# Patient Record
Sex: MALE | Race: WHITE | NOT HISPANIC OR LATINO | URBAN - METROPOLITAN AREA
[De-identification: names, ages, dates, MRNs, and addresses within clinical notes are randomized per-mention and may not be internally consistent; named-entity substitution may affect disease eponyms.]

---

## 2024-03-18 ENCOUNTER — TELEPHONE (OUTPATIENT)
Dept: URGENT CARE | Facility: CLINIC | Age: 19
End: 2024-03-18

## 2024-03-19 ENCOUNTER — OCCUPATIONAL HEALTH (OUTPATIENT)
Dept: URGENT CARE | Facility: CLINIC | Age: 19
End: 2024-03-19

## 2024-03-19 DIAGNOSIS — Z02.1 PRE-EMPLOYMENT EXAMINATION: Primary | ICD-10-CM

## 2024-03-19 LAB
CTP QC/QA: YES
POC 10 PANEL DRUG SCREEN: ABNORMAL

## 2024-03-19 PROCEDURE — 99499 UNLISTED E&M SERVICE: CPT | Mod: S$GLB,,, | Performed by: NURSE PRACTITIONER

## 2024-03-19 PROCEDURE — 80305 DRUG TEST PRSMV DIR OPT OBS: CPT | Mod: S$GLB,,, | Performed by: NURSE PRACTITIONER

## 2025-06-01 ENCOUNTER — HOSPITAL ENCOUNTER (EMERGENCY)
Facility: HOSPITAL | Age: 20
Discharge: HOME | End: 2025-06-01
Attending: EMERGENCY MEDICINE
Payer: COMMERCIAL

## 2025-06-01 VITALS
SYSTOLIC BLOOD PRESSURE: 160 MMHG | RESPIRATION RATE: 20 BRPM | HEIGHT: 62 IN | TEMPERATURE: 98 F | DIASTOLIC BLOOD PRESSURE: 92 MMHG | BODY MASS INDEX: 39.36 KG/M2 | HEART RATE: 89 BPM | OXYGEN SATURATION: 100 % | WEIGHT: 213.88 LBS

## 2025-06-01 DIAGNOSIS — R07.89 ATYPICAL CHEST PAIN: ICD-10-CM

## 2025-06-01 DIAGNOSIS — R07.9 CHEST PAIN: Primary | ICD-10-CM

## 2025-06-01 LAB
ABSOLUTE EOSINOPHIL (OHS): 0.14 K/UL
ABSOLUTE MONOCYTE (OHS): 0.81 K/UL (ref 0.3–1)
ABSOLUTE NEUTROPHIL COUNT (OHS): 9.99 K/UL (ref 1.8–7.7)
ALBUMIN SERPL BCP-MCNC: 4.4 G/DL (ref 3.5–5.2)
ALP SERPL-CCNC: 81 UNIT/L (ref 40–150)
ALT SERPL W/O P-5'-P-CCNC: 37 UNIT/L (ref 10–44)
ANION GAP (OHS): 9 MMOL/L (ref 8–16)
AST SERPL-CCNC: 20 UNIT/L (ref 11–45)
BASOPHILS # BLD AUTO: 0.06 K/UL
BASOPHILS NFR BLD AUTO: 0.5 %
BILIRUB SERPL-MCNC: 0.2 MG/DL (ref 0.1–1)
BNP SERPL-MCNC: <10 PG/ML (ref 0–99)
BUN SERPL-MCNC: 15 MG/DL (ref 6–20)
CALCIUM SERPL-MCNC: 9.6 MG/DL (ref 8.7–10.5)
CHLORIDE SERPL-SCNC: 108 MMOL/L (ref 95–110)
CO2 SERPL-SCNC: 23 MMOL/L (ref 23–29)
CREAT SERPL-MCNC: 0.8 MG/DL (ref 0.5–1.4)
ERYTHROCYTE [DISTWIDTH] IN BLOOD BY AUTOMATED COUNT: 13.7 % (ref 11.5–14.5)
GFR SERPLBLD CREATININE-BSD FMLA CKD-EPI: >60 ML/MIN/1.73/M2
GLUCOSE SERPL-MCNC: 90 MG/DL (ref 70–110)
HCT VFR BLD AUTO: 45.8 % (ref 40–54)
HGB BLD-MCNC: 15 GM/DL (ref 14–18)
IMM GRANULOCYTES # BLD AUTO: 0.04 K/UL (ref 0–0.04)
IMM GRANULOCYTES NFR BLD AUTO: 0.3 % (ref 0–0.5)
LYMPHOCYTES # BLD AUTO: 2.09 K/UL (ref 1–4.8)
MCH RBC QN AUTO: 26.9 PG (ref 27–31)
MCHC RBC AUTO-ENTMCNC: 32.8 G/DL (ref 32–36)
MCV RBC AUTO: 82 FL (ref 82–98)
NUCLEATED RBC (/100WBC) (OHS): 0 /100 WBC
PLATELET # BLD AUTO: 264 K/UL (ref 150–450)
PMV BLD AUTO: 10 FL (ref 9.2–12.9)
POTASSIUM SERPL-SCNC: 4.3 MMOL/L (ref 3.5–5.1)
PROT SERPL-MCNC: 7.9 GM/DL (ref 6–8.4)
RBC # BLD AUTO: 5.58 M/UL (ref 4.6–6.2)
RELATIVE EOSINOPHIL (OHS): 1.1 %
RELATIVE LYMPHOCYTE (OHS): 15.9 % (ref 18–48)
RELATIVE MONOCYTE (OHS): 6.2 % (ref 4–15)
RELATIVE NEUTROPHIL (OHS): 76 % (ref 38–73)
SODIUM SERPL-SCNC: 140 MMOL/L (ref 136–145)
TROPONIN I SERPL DL<=0.01 NG/ML-MCNC: <0.006 NG/ML
WBC # BLD AUTO: 13.13 K/UL (ref 3.9–12.7)

## 2025-06-01 PROCEDURE — 85025 COMPLETE CBC W/AUTO DIFF WBC: CPT | Performed by: EMERGENCY MEDICINE

## 2025-06-01 PROCEDURE — 99285 EMERGENCY DEPT VISIT HI MDM: CPT | Mod: 25

## 2025-06-01 PROCEDURE — 71045 X-RAY EXAM CHEST 1 VIEW: CPT | Mod: TC

## 2025-06-01 PROCEDURE — 71045 X-RAY EXAM CHEST 1 VIEW: CPT | Mod: 26,,, | Performed by: RADIOLOGY

## 2025-06-01 PROCEDURE — 83880 ASSAY OF NATRIURETIC PEPTIDE: CPT | Performed by: EMERGENCY MEDICINE

## 2025-06-01 PROCEDURE — 80053 COMPREHEN METABOLIC PANEL: CPT | Performed by: EMERGENCY MEDICINE

## 2025-06-01 PROCEDURE — 87389 HIV-1 AG W/HIV-1&-2 AB AG IA: CPT | Performed by: EMERGENCY MEDICINE

## 2025-06-01 PROCEDURE — 84484 ASSAY OF TROPONIN QUANT: CPT | Performed by: EMERGENCY MEDICINE

## 2025-06-01 PROCEDURE — 25000003 PHARM REV CODE 250: Performed by: EMERGENCY MEDICINE

## 2025-06-01 PROCEDURE — 93005 ELECTROCARDIOGRAM TRACING: CPT

## 2025-06-01 PROCEDURE — 93010 ELECTROCARDIOGRAM REPORT: CPT | Mod: ,,, | Performed by: INTERNAL MEDICINE

## 2025-06-01 PROCEDURE — 86803 HEPATITIS C AB TEST: CPT | Performed by: EMERGENCY MEDICINE

## 2025-06-01 RX ORDER — ASPIRIN 325 MG
325 TABLET ORAL
Status: COMPLETED | OUTPATIENT
Start: 2025-06-01 | End: 2025-06-01

## 2025-06-01 RX ORDER — ASPIRIN 81 MG/1
81 TABLET ORAL DAILY
Qty: 30 TABLET | Refills: 0 | Status: SHIPPED | OUTPATIENT
Start: 2025-06-01 | End: 2025-07-01

## 2025-06-01 RX ADMIN — ASPIRIN 325 MG: 325 TABLET ORAL at 11:06

## 2025-06-02 LAB
HCV AB SERPL QL IA: NORMAL
HIV 1+2 AB+HIV1 P24 AG SERPL QL IA: NORMAL
OHS QRS DURATION: 86 MS
OHS QTC CALCULATION: 409 MS

## 2025-06-27 ENCOUNTER — LAB VISIT (OUTPATIENT)
Dept: LAB | Facility: HOSPITAL | Age: 20
End: 2025-06-27
Attending: INTERNAL MEDICINE
Payer: COMMERCIAL

## 2025-06-27 ENCOUNTER — RESULTS FOLLOW-UP (OUTPATIENT)
Dept: CARDIOLOGY | Facility: HOSPITAL | Age: 20
End: 2025-06-27

## 2025-06-27 ENCOUNTER — OFFICE VISIT (OUTPATIENT)
Dept: CARDIOLOGY | Facility: CLINIC | Age: 20
End: 2025-06-27
Payer: COMMERCIAL

## 2025-06-27 VITALS
BODY MASS INDEX: 39.31 KG/M2 | OXYGEN SATURATION: 98 % | WEIGHT: 213.63 LBS | HEART RATE: 80 BPM | HEIGHT: 62 IN | SYSTOLIC BLOOD PRESSURE: 132 MMHG | DIASTOLIC BLOOD PRESSURE: 82 MMHG

## 2025-06-27 DIAGNOSIS — Z82.49 FAMILY HISTORY OF PREMATURE CAD: ICD-10-CM

## 2025-06-27 DIAGNOSIS — Z77.22 SECOND HAND SMOKE EXPOSURE: ICD-10-CM

## 2025-06-27 DIAGNOSIS — R07.2 PRECORDIAL CHEST PAIN: Primary | ICD-10-CM

## 2025-06-27 DIAGNOSIS — E66.812 CLASS 2 OBESITY DUE TO EXCESS CALORIES WITHOUT SERIOUS COMORBIDITY WITH BODY MASS INDEX (BMI) OF 39.0 TO 39.9 IN ADULT: ICD-10-CM

## 2025-06-27 DIAGNOSIS — R07.2 PRECORDIAL CHEST PAIN: ICD-10-CM

## 2025-06-27 DIAGNOSIS — E66.09 CLASS 2 OBESITY DUE TO EXCESS CALORIES WITHOUT SERIOUS COMORBIDITY WITH BODY MASS INDEX (BMI) OF 39.0 TO 39.9 IN ADULT: ICD-10-CM

## 2025-06-27 DIAGNOSIS — D72.825 BANDEMIA: ICD-10-CM

## 2025-06-27 LAB
CHOLEST SERPL-MCNC: 174 MG/DL (ref 120–199)
CHOLEST/HDLC SERPL: 5.1 {RATIO} (ref 2–5)
HDLC SERPL-MCNC: 34 MG/DL (ref 40–75)
HDLC SERPL: 19.5 % (ref 20–50)
LDLC SERPL CALC-MCNC: 122.2 MG/DL (ref 63–159)
NONHDLC SERPL-MCNC: 140 MG/DL
OHS QRS DURATION: 82 MS
OHS QTC CALCULATION: 392 MS
TRIGL SERPL-MCNC: 89 MG/DL (ref 30–150)

## 2025-06-27 PROCEDURE — 99999 PR PBB SHADOW E&M-EST. PATIENT-LVL III: CPT | Mod: PBBFAC,,, | Performed by: INTERNAL MEDICINE

## 2025-06-27 PROCEDURE — 36415 COLL VENOUS BLD VENIPUNCTURE: CPT

## 2025-06-27 PROCEDURE — 80061 LIPID PANEL: CPT

## 2025-06-27 NOTE — PROGRESS NOTES
Subjective:        Patient ID:  Uri Dunlap is a 20 y.o. male who presents for evaluation of Chest Pain and Establish Care  ED and referred by Meng Antonio MD   PCP: none  No prior cardiologist  Lives with parents, father Abraham, here with patient. Mother smokes indoor.  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     Health literacy: medium  Vaccinations: decline vaccination, no COVID infection  Activities: no exercise outside of work, no problem at work, not limited  Nicotine: non-smoker  Alcohol: no  Illicit drugs: No  Cardiac concerns: recent CP with exertion  Home BP: No  Medication compliance: no meds  Diet: Regular, a lot of salt  Caffeine: How much do you consume a day? 1-2 cpd  Labs:   Sodium   Date Value Ref Range Status   06/01/2025 140 136 - 145 mmol/L Final     Potassium   Date Value Ref Range Status   06/01/2025 4.3 3.5 - 5.1 mmol/L Final     Chloride   Date Value Ref Range Status   06/01/2025 108 95 - 110 mmol/L Final     CO2   Date Value Ref Range Status   06/01/2025 23 23 - 29 mmol/L Final     Glucose   Date Value Ref Range Status   06/01/2025 90 70 - 110 mg/dL Final     BUN   Date Value Ref Range Status   06/01/2025 15 6 - 20 mg/dL Final     Creatinine   Date Value Ref Range Status   06/01/2025 0.8 0.5 - 1.4 mg/dL Final     Calcium   Date Value Ref Range Status   06/01/2025 9.6 8.7 - 10.5 mg/dL Final     Protein Total   Date Value Ref Range Status   06/01/2025 7.9 6.0 - 8.4 gm/dL Final     Albumin   Date Value Ref Range Status   06/01/2025 4.4 3.5 - 5.2 g/dL Final     Bilirubin Total   Date Value Ref Range Status   06/01/2025 0.2 0.1 - 1.0 mg/dL Final     Comment:     For infants and newborns, interpretation of results should be based   on gestational age, weight and in agreement with clinical   observations.    Premature Infant recommended reference ranges:   0-24 hours:  <8.0 mg/dL   24-48 hours: <12.0 mg/dL   3-5  "days:    <15.0 mg/dL   6-29 days:   <15.0 mg/dL     ALP   Date Value Ref Range Status   06/01/2025 81 40 - 150 unit/L Final     AST   Date Value Ref Range Status   06/01/2025 20 11 - 45 unit/L Final     ALT   Date Value Ref Range Status   06/01/2025 37 10 - 44 unit/L Final     Anion Gap   Date Value Ref Range Status   06/01/2025 9 8 - 16 mmol/L Final     eGFR   Date Value Ref Range Status   06/01/2025 >60 >60 mL/min/1.73/m2 Final     Comment:     Estimated GFR calculated using the CKD-EPI creatinine (2021) equation.      Lab Results   Component Value Date    WBC 13.13 (H) 06/01/2025    RBC 5.58 06/01/2025    HGB 15.0 06/01/2025    HCT 45.8 06/01/2025    MCV 82 06/01/2025    MCH 26.9 (L) 06/01/2025    MCHC 32.8 06/01/2025    RDW 13.7 06/01/2025     06/01/2025    MPV 10.0 06/01/2025    LYMPH 15.9 (L) 06/01/2025    LYMPH 2.09 06/01/2025    MONO 6.2 06/01/2025    MONO 0.81 06/01/2025    EOS 1.1 06/01/2025    EOS 0.14 06/01/2025    BASOPHIL 0.5 06/01/2025    BASOPHIL 0.06 06/01/2025      No results found for: "TSH"  No results found for: "LABA1C", "HGBA1C"   BNP   Date Value Ref Range Status   06/01/2025 <10 0 - 99 pg/mL Final     Comment:     Values of less than 100 pg/ml are consistent with non-CHF populations.      Troponin-I   Date Value Ref Range Status   06/01/2025 <0.006 <=0.026 ng/mL Final     Comment:     The reference interval for Troponin I represents the 99th percentile cutoff for our facility and is consistent with 3rd generation assay performance.     eGFR   Date Value Ref Range Status   06/01/2025 >60 >60 mL/min/1.73/m2 Final     Comment:     Estimated GFR calculated using the CKD-EPI creatinine (2021) equation.     No results found for: "CHOL"   No results found for: "HDL"  No results found for: "LDLCALC"   No results found for: "TRIG"  No results found for: "TOTALCHOLEST"  No results found for: "NONHDLCHOL"  No results found for: "CHOLHDL"    Last Echo:   No results found for this or any previous " "visit.    Last stress test:   No results found for this or any previous visit.     Cardiovascular angiogram: none   ECG:   Results for orders placed or performed during the hospital encounter of 25   EKG 12-lead    Collection Time: 25  7:32 PM   Result Value Ref Range    QRS Duration 86 ms    OHS QTC Calculation 409 ms    Narrative    Test Reason : R07.9,    Vent. Rate :  95 BPM     Atrial Rate :  95 BPM     P-R Int : 146 ms          QRS Dur :  86 ms      QT Int : 326 ms       P-R-T Axes :  51  38  48 degrees    QTcB Int : 409 ms    Normal sinus rhythm  Normal ECG  No previous ECGs available  Confirmed by Zafar Hassan ([id number]) on 2025 8:51:31 AM    Referred By: AAAREFERRAL SELF           Confirmed By: Zafar Hassan      Today: normal, rate 74    Fundoscopic exam: due    WM referred for recent onset of precordial chest squeezing, random but related to exertion. Recall 5 times over this past month. Max intensity rated 4/10 and last up to an hour. No prior medical problem. Exposed to second hand smoke with mother heavy smoking indoor. Maternal Uncle and paternal grandfather had MIs in their 20s. Paternal Aunt had MI in her early 60s and  at age 62 with CA.    Meng Antonio MD noted 2025 "Chest Pain  Patient with c/o chest pain that started this evening; States episode of left sided cramping chest pain that lasted for one hour; States several episodes throughout the day that came and went; Denies cardiac history, but several family members with heart issues     HPI:  Uri Dunlap is a 20 y.o. male with PMH as below who presents to the [facility] emergency department for evaluation of intermittent, squeezing, nonradiating, central chest pain lasting up to an hour at a time.He's been diagnosed with high cholesterol. He has a strong family history of early CAD on both sides including a family member with MI in the 20s. He has no other complaints.     VS - 160/92  88  20  97.7 °F (36.5 °C)  100 " %    CXR -  lungs are well expanded and clear. No focal opacities are seen. The pleural spaces are clear. The cardiac silhouette is unremarkable.       Review of Systems   Constitutional: Positive for malaise/fatigue and weight gain. Negative for chills, decreased appetite, diaphoresis, fever, night sweats and weight loss.   HENT:  Negative for congestion, hearing loss, hoarse voice, nosebleeds, sore throat and tinnitus.    Eyes:  Negative for double vision, pain and visual disturbance.   Cardiovascular:  Positive for chest pain and palpitations. Negative for claudication, cyanosis, dyspnea on exertion, irregular heartbeat, leg swelling, near-syncope, orthopnea and paroxysmal nocturnal dyspnea.   Respiratory:  Negative for cough, shortness of breath, sleep disturbances due to breathing, snoring, sputum production and wheezing.         [place] score 2, awaken refreshed.   Endocrine: Negative for cold intolerance, heat intolerance, polydipsia and polyuria.   Hematologic/Lymphatic: Negative for bleeding problem. Does not bruise/bleed easily.   Skin:  Negative for color change, flushing, nail changes, poor wound healing and suspicious lesions.   Musculoskeletal:  Negative for arthritis, falls, gout, joint pain, joint swelling, muscle cramps, muscle weakness, myalgias and neck pain.   Gastrointestinal:  Negative for change in bowel habit, constipation, diarrhea, dysphagia, heartburn, hematemesis, hematochezia, jaundice, melena and nausea.   Genitourinary:  Negative for bladder incontinence, frequency, hematuria, hesitancy and urgency.   Neurological:  Positive for numbness and paresthesias. Negative for disturbances in coordination, excessive daytime sleepiness, dizziness, focal weakness, headaches, light-headedness, loss of balance, seizures, vertigo and weakness.   Psychiatric/Behavioral:  Negative for depression, memory loss and substance abuse. The patient does not have insomnia and is not nervous/anxious.   "    Answers submitted by the patient for this visit:  Review of Symptoms (Submitted on 6/27/2025)  Sweats?: Yes  chest tightness: Yes  Difficulty breathing when lying down?: No  syncope: No       Objective:    Physical Exam  Constitutional:       Appearance: He is well-developed.      Comments: RA O2 sat 98%  Orthostatic BP: sitting 133/84, standing 132/82     HENT:      Head: Normocephalic.   Eyes:      Conjunctiva/sclera: Conjunctivae normal.      Pupils: Pupils are equal, round, and reactive to light.   Neck:      Thyroid: No thyromegaly.      Vascular: No JVD.   Cardiovascular:      Rate and Rhythm: Normal rate and regular rhythm.      Pulses: Intact distal pulses.           Carotid pulses are 1+ on the right side and 1+ on the left side.       Radial pulses are 1+ on the right side and 1+ on the left side.        Dorsalis pedis pulses are 1+ on the right side and 1+ on the left side.        Posterior tibial pulses are 1+ on the right side and 1+ on the left side.      Heart sounds: Normal heart sounds. Heart sounds not distant. No murmur heard.     No friction rub. No gallop.   Pulmonary:      Effort: Pulmonary effort is normal.      Breath sounds: No rales.      Comments: Diminished breath sounds   Chest:      Chest wall: No tenderness.   Abdominal:      General: Bowel sounds are normal.      Palpations: Abdomen is soft.      Tenderness: There is no abdominal tenderness.      Comments: Waist 39.5"   Musculoskeletal:         General: Normal range of motion.      Cervical back: Normal range of motion and neck supple.      Right lower leg: No edema.      Left lower leg: No edema.   Lymphadenopathy:      Cervical: No cervical adenopathy.   Skin:     General: Skin is warm and dry.      Findings: No rash.   Neurological:      Mental Status: He is alert and oriented to person, place, and time.           Assessment:       1. Precordial chest pain    2. Second hand smoke exposure    3. Bandemia    4. Family history of " premature CAD    5. Class 2 obesity due to excess calories without serious comorbidity with body mass index (BMI) of 39.0 to 39.9 in adult         Plan:       Uri was seen today for chest pain and establish care.    Diagnoses and all orders for this visit:    Precordial chest pain  -     IN OFFICE EKG 12-LEAD (to Muse)  -     Lipid Panel; Future  -     Exercise Stress - EKG    Second hand smoke exposure  -     Exercise Stress - EKG    Bandemia    Family history of premature CAD  -     Lipid Panel; Future  -     Exercise Stress - EKG    Class 2 obesity due to excess calories without serious comorbidity with body mass index (BMI) of 39.0 to 39.9 in adult  -     Lipid Panel; Future     - All medical issues reviewed, suspect some recent infection with elevated WBC. NO INDOOR SMOKING!  - Due to premature family history, need to be aware of the warning signs of MI and stroke given, if symptoms last more than 5 minutes, stop immediately and call 911, then chew 2-4 low-dose ASA (81 mg).   - Need fundoscopic examination   - CV status and all medications reviewed, patient acknowledge good understanding.  - Recommend healthy living: avoid alcohol, healthy diet and regular exercise aiming for fitness, and weight control  - Need good exercise program, 4 key elements: 1. Aerobic (walking, swimming, dancing, jogging, biking, etc, 2. Muscle strengthening / resistance exercise, need to do 2-3 times weekly, 3. Stretching daily, good stretch takes a whole  total minute. 4. Balance exercise daily.   - Instruction for Mediterranean diet and heart healthy exercise given.  - Weigh twice weekly, try to lose 1-2 lbs per week. Target weight loss of 5%-10%.  - Highly recommend 30-60 minutes of exercise / activities daily, can have Sunday off, with 2-3 sessions of muscle strengthening weekly. A  would be very helpful.  - Likely low ASCVD risk, follow up only as needed.   - Phone review / encourage use of [facility], no  [facility]  - [facility] support at [facility] is available 5 days a week, from 9 to 5, at [phone].      Total time spend including review of record prior to face-to-face visit is 45 minutes. Greater than 50% of the time was spent in counseling and coordination of care. The above assessment and plan have been discussed at length. Referring provider's note reviewed. Labs and procedure over the last 6 months reviewed. Problem List updated. Asked to bring in all active medications / pills bottles with next visit. Will send note to referring / PCP.

## 2025-06-27 NOTE — PATIENT INSTRUCTIONS
Recommended Mediterranean dietEating Heart-Healthy Food: Using the DASH Plan  Eating for your heart doesnt have to be hard or boring. You just need to know how to make healthier choices. The DASH eating plan has been developed to help you do just that. DASH stands for Dietary Approaches to Stop Hypertension. It is a plan that has been proven to be healthier for your heart and to lower your risk for high blood pressure. It can also help lower your risk for cancer, heart disease, osteoporosis, and diabetes.  Choosing from Each Food Group  Choose foods from each of the food groups below each day. Try to get the recommended number of servings for each food group. The serving numbers are based on a diet of 2,000 calories a day. Talk to your doctor if youre unsure about your calorie needs.  Grains   Servings: 7-8 a day  A serving is:  1 slice bread  1 ounce dry cereal  half a cup cooked rice or pasta  Best choices: Whole grains and any grains high in fiber.  Vegetables   Servings: 4-5 a day  A serving is:  1 cup raw leafy vegetable  Half a cup cooked vegetable  Three-quarter cup vegetable juice  Best choices: Fresh or frozen vegetable prepared without too much added salt or fat.    Fruits   Servings: 4-5 a day  A serving is:  Three-quarter cup fruit juice  1 medium fruit  One-quarter cup dried fruit  One-half cup fresh, frozen, or canned fruit  Best choices: A variety of fresh fruits of different colors. Whole fruits are a much better choice than fruit juices.  Low-fat or Fat Free Dairy   Servings: 2-3 a day  A serving is:  8 ounces milk  1 cup yogurt  One and a half ounces cheese  Best choices: Skim or 1% milk, low-fat or fat free yogurt or buttermilk, and low-fat cheeses.       Meat, Poultry, Fish   Servings: 2 or fewer a day  A serving is:  3 ounces cooked meat, poultry, or fish  Best choices: Lean meats and fish. Trim away visible fat. Broil, roast, or boil instead of frying. Remove skin from poultry before eating.   Nuts, Seeds, Beans   Servings: 4-5 a week  A serving is:  One third cup nuts (or one and a half ounces)  2 tablespoons sunflower seeds  Half a cup cooked beans  Best choices: Dry roasted nuts with no salt added, lentils, kidney beans, garbanzo beans, and whole ferrell beans.    Fats and Oils   Servings: 2 a day  A serving is:  1 teaspoon vegetable oil  1 teaspoon soft margarine  1 tablespoon low-fat mayonnaise  1 teaspoon regular mayonnaise  2 tablespoons light salad dressing  1 tablespoon regular salad dressing  Best choices: Monounsaturated and polyunsaturated fats such as olive, canola, or safflower oil.  Sweets   Servings: 5 a week or fewer  A serving is:  1 tablespoon sugar, maple syrup, or honey  1 tablespoon jam or jelly  1 half-ounce jelly beans (about 15)  8 ounces lemonade  Best choices: Dried fruit can be a satisfying sweet. Choose low-fat sweets when possible. And watch your serving sizes!       Aerobic Exercise for a Healthy Heart  Exercise is a lot more than an energy booster and a stress reliever. It also strengthens your heart muscle, lowers your blood pressure and blood cholesterol, and burns calories.      Remember, some activity is better than none.     Choose an Aerobic Activity  Choose a nonstop activity that makes your heart and lungs work harder than they do when you rest or walk normally. This aerobic exercise can improve the way your heart and other muscles use oxygen. Make it fun by exercising with a friend and choosing an activity you enjoy. Here are some ideas:  Walking  Swimming  Bicycling  Stair climbing  Dancing  Jogging  Exercise Regularly  If you havent been exercising regularly,  get your doctors okay first. Then start slowly.  Here are some tips:  Begin exercising 3 times a week for 5-10 minutes at a time.  When you feel comfortable, add a few minutes each week.  Slowly build up to exercising 3-4 times each week for 20-40 minutes. Aim for a total of 150 or more minutes a  week.  Be sure to carry your nitroglycerin with you when you exercise.  If you get angina when youre exercising, stop what youre doing, take your nitroglycerin, and call your doctor.  © [phone] [employer], [address] [place] [address], [place] [address]. All rights reserved. This information is not intended as a substitute for professional medical care. Always follow your healthcare professional's instructions.     Losing Weight (Cardiovascular)  Excess weight is a major risk factor for heart disease. Losing weight may help keep your arteries open so that your heart can get the oxygen-rich blood it needs. Weight loss can also help lower your blood pressure and reduce your risk for diabetes. All in all, losing weight makes you healthier.          Exercise with a friend. When activity is fun, you're more likely to stick with it.        Calories and Weight Loss  Calories are the fuel your body burns for energy. You get the calories you need from the food you eat. For healthy weight loss, women should eat at least 1,200 calories a day, men at least 1,500.    When you eat more calories than you need, your body stores the extra calories as fat. One pound of fat equals 3,500 calories.    To lose weight, try to burn 500 calories a day more than you eat. To do this, eat 250 calories less each day. Add activity to burn the other 250 calories. Walking 21/2 miles burns about 250 calories.    Eat a variety of healthy foods. Its the best way to make calories count.     Tips for losing weight:  Drink 8 to 10 glasses of water a day.    Dont skip meals. Instead, eat smaller portions.       Brisk Activity Is Best  Brisk activity gets your heart pumping faster. It makes your heart healthier. Its also the best way to burn calories. In fact, your body may keep burning calories for hours after you stop a brisk activity.    Begin by walking 10 minutes most days.    Add more time and speed to your walk. Build up as you feel  able.    Try to walk briskly at least 30 minutes most days. If needed, you can break this into 2 shorter sessions.     Check off the ideas below that you could try to make your day more active:    Take the stairs instead of the elevator.    Park your car farther away and walk.    Ride a bike to work or to the store.    Walk laps around the mall.

## 2025-07-01 ENCOUNTER — PATIENT MESSAGE (OUTPATIENT)
Dept: CARDIOLOGY | Facility: CLINIC | Age: 20
End: 2025-07-01
Payer: COMMERCIAL

## 2025-08-11 PROBLEM — D72.825 BANDEMIA: Status: RESOLVED | Noted: 2025-06-27 | Resolved: 2025-08-11

## 2025-08-11 PROBLEM — R07.2 PRECORDIAL CHEST PAIN: Status: RESOLVED | Noted: 2025-06-27 | Resolved: 2025-08-11
